# Patient Record
Sex: MALE
[De-identification: names, ages, dates, MRNs, and addresses within clinical notes are randomized per-mention and may not be internally consistent; named-entity substitution may affect disease eponyms.]

---

## 2017-02-15 PROCEDURE — 88305 TISSUE EXAM BY PATHOLOGIST: CPT | Performed by: INTERNAL MEDICINE

## 2017-07-04 ENCOUNTER — DIAGNOSTIC TRANS (OUTPATIENT)
Dept: OTHER | Age: 72
End: 2017-07-04

## 2017-07-04 ENCOUNTER — HOSPITAL (OUTPATIENT)
Dept: OTHER | Age: 72
End: 2017-07-04
Attending: EMERGENCY MEDICINE

## 2017-07-04 LAB
ANALYZER ANC (IANC): ABNORMAL
ANION GAP SERPL CALC-SCNC: 12 MMOL/L (ref 10–20)
BASOPHILS # BLD: 0 THOUSAND/MCL (ref 0–0.3)
BASOPHILS NFR BLD: 1 %
BNP SERPL-MCNC: <5 PG/ML
BUN SERPL-MCNC: 22 MG/DL (ref 6–20)
BUN/CREAT SERPL: 15 (ref 7–25)
CALCIUM SERPL-MCNC: 9.1 MG/DL (ref 8.4–10.2)
CHLORIDE: 105 MMOL/L (ref 98–107)
CO2 SERPL-SCNC: 29 MMOL/L (ref 21–32)
CREAT SERPL-MCNC: 1.43 MG/DL (ref 0.67–1.17)
D DIMER PPP FEU-MCNC: 0.34 MG/L FEU
DIFFERENTIAL METHOD BLD: ABNORMAL
EOSINOPHIL # BLD: 0.2 THOUSAND/MCL (ref 0.1–0.5)
EOSINOPHIL NFR BLD: 4 %
ERYTHROCYTE [DISTWIDTH] IN BLOOD: 13 % (ref 11–15)
GLUCOSE SERPL-MCNC: 106 MG/DL (ref 65–99)
HEMATOCRIT: 42.3 % (ref 39–51)
HGB BLD-MCNC: 15.5 GM/DL (ref 13–17)
LYMPHOCYTES # BLD: 1.8 THOUSAND/MCL (ref 1–4)
LYMPHOCYTES NFR BLD: 30 %
MCH RBC QN AUTO: 32.4 PG (ref 26–34)
MCHC RBC AUTO-ENTMCNC: 36.6 GM/DL (ref 32–36.5)
MCV RBC AUTO: 88.5 FL (ref 78–100)
MONOCYTES # BLD: 0.5 THOUSAND/MCL (ref 0.3–0.9)
MONOCYTES NFR BLD: 9 %
NEUTROPHILS # BLD: 3.5 THOUSAND/MCL (ref 1.8–7.7)
NEUTROPHILS NFR BLD: 56 %
NEUTS SEG NFR BLD: ABNORMAL %
PERCENT NRBC: ABNORMAL
PLATELET # BLD: 209 THOUSAND/MCL (ref 140–450)
POTASSIUM SERPL-SCNC: 4 MMOL/L (ref 3.4–5.1)
RBC # BLD: 4.78 MILLION/MCL (ref 4.5–5.9)
SODIUM SERPL-SCNC: 142 MMOL/L (ref 135–145)
TROPONIN I SERPL HS-MCNC: <0.02 NG/ML
WBC # BLD: 6.1 THOUSAND/MCL (ref 4.2–11)

## 2017-07-05 ENCOUNTER — CHARTING TRANS (OUTPATIENT)
Dept: OTHER | Age: 72
End: 2017-07-05

## 2017-08-31 ENCOUNTER — HOSPITAL (OUTPATIENT)
Dept: OTHER | Age: 72
End: 2017-08-31
Attending: INTERNAL MEDICINE

## 2017-08-31 LAB
ANALYZER ANC (IANC): NORMAL
ANION GAP SERPL CALC-SCNC: 12 MMOL/L (ref 10–20)
APTT PPP: 27 SECONDS (ref 22–30)
APTT PPP: NORMAL S
BASOPHILS # BLD: 0 THOUSAND/MCL (ref 0–0.3)
BASOPHILS NFR BLD: 1 %
BUN SERPL-MCNC: 20 MG/DL (ref 6–20)
BUN/CREAT SERPL: 15 (ref 7–25)
CALCIUM SERPL-MCNC: 8.9 MG/DL (ref 8.4–10.2)
CHLORIDE: 107 MMOL/L (ref 98–107)
CO2 SERPL-SCNC: 30 MMOL/L (ref 21–32)
CREAT SERPL-MCNC: 1.34 MG/DL (ref 0.67–1.17)
DIFFERENTIAL METHOD BLD: NORMAL
EOSINOPHIL # BLD: 0.2 THOUSAND/MCL (ref 0.1–0.5)
EOSINOPHIL NFR BLD: 4 %
ERYTHROCYTE [DISTWIDTH] IN BLOOD: 13.2 % (ref 11–15)
GLUCOSE BLDC GLUCOMTR-MCNC: 161 MG/DL (ref 65–99)
GLUCOSE SERPL-MCNC: 109 MG/DL (ref 65–99)
HEMATOCRIT: 43.2 % (ref 39–51)
HGB BLD-MCNC: 15 GM/DL (ref 13–17)
INR PPP: 1
LYMPHOCYTES # BLD: 1.9 THOUSAND/MCL (ref 1–4)
LYMPHOCYTES NFR BLD: 30 %
MCH RBC QN AUTO: 30.9 PG (ref 26–34)
MCHC RBC AUTO-ENTMCNC: 34.7 GM/DL (ref 32–36.5)
MCV RBC AUTO: 89.1 FL (ref 78–100)
MONOCYTES # BLD: 0.6 THOUSAND/MCL (ref 0.3–0.9)
MONOCYTES NFR BLD: 9 %
NEUTROPHILS # BLD: 3.6 THOUSAND/MCL (ref 1.8–7.7)
NEUTROPHILS NFR BLD: 56 %
NEUTS SEG NFR BLD: NORMAL %
PERCENT NRBC: NORMAL
PLATELET # BLD: 210 THOUSAND/MCL (ref 140–450)
POTASSIUM SERPL-SCNC: 4 MMOL/L (ref 3.4–5.1)
PROTHROMBIN TIME: 11 SECONDS (ref 9.7–11.8)
PROTHROMBIN TIME: NORMAL
RBC # BLD: 4.85 MILLION/MCL (ref 4.5–5.9)
SODIUM SERPL-SCNC: 145 MMOL/L (ref 135–145)
TROPONIN I SERPL HS-MCNC: <0.02 NG/ML
WBC # BLD: 6.4 THOUSAND/MCL (ref 4.2–11)

## 2017-09-01 ENCOUNTER — DIAGNOSTIC TRANS (OUTPATIENT)
Dept: OTHER | Age: 72
End: 2017-09-01

## 2017-09-01 LAB
ALBUMIN SERPL-MCNC: 4.1 GM/DL (ref 3.6–5.1)
ALBUMIN/GLOB SERPL: 1.5 {RATIO} (ref 1–2.4)
ALP SERPL-CCNC: 86 UNIT/L (ref 45–117)
ALT SERPL-CCNC: 24 UNIT/L
ANALYZER ANC (IANC): ABNORMAL
ANION GAP SERPL CALC-SCNC: 16 MMOL/L (ref 10–20)
AST SERPL-CCNC: 15 UNIT/L
BASOPHILS # BLD: 0 THOUSAND/MCL (ref 0–0.3)
BASOPHILS NFR BLD: 0 %
BILIRUB SERPL-MCNC: 2.4 MG/DL (ref 0.2–1)
BUN SERPL-MCNC: 17 MG/DL (ref 6–20)
BUN/CREAT SERPL: 13 (ref 7–25)
CALCIUM SERPL-MCNC: 9 MG/DL (ref 8.4–10.2)
CHLORIDE: 104 MMOL/L (ref 98–107)
CHOLEST SERPL-MCNC: 149 MG/DL
CHOLEST/HDLC SERPL: 3.5 {RATIO}
CO2 SERPL-SCNC: 29 MMOL/L (ref 21–32)
CREAT SERPL-MCNC: 1.28 MG/DL (ref 0.67–1.17)
DIFFERENTIAL METHOD BLD: ABNORMAL
EOSINOPHIL # BLD: 0 THOUSAND/MCL (ref 0.1–0.5)
EOSINOPHIL NFR BLD: 0 %
ERYTHROCYTE [DISTWIDTH] IN BLOOD: 13 % (ref 11–15)
GLOBULIN SER-MCNC: 2.8 GM/DL (ref 2–4)
GLUCOSE BLDC GLUCOMTR-MCNC: 133 MG/DL (ref 65–99)
GLUCOSE SERPL-MCNC: 130 MG/DL (ref 65–99)
HDLC SERPL-MCNC: 43 MG/DL
HEMATOCRIT: 43.7 % (ref 39–51)
HGB BLD-MCNC: 15.4 GM/DL (ref 13–17)
LDLC SERPL CALC-MCNC: 90 MG/DL
LYMPHOCYTES # BLD: 1.5 THOUSAND/MCL (ref 1–4)
LYMPHOCYTES NFR BLD: 16 %
MAGNESIUM SERPL-MCNC: 1.8 MG/DL (ref 1.7–2.4)
MCH RBC QN AUTO: 31.2 PG (ref 26–34)
MCHC RBC AUTO-ENTMCNC: 35.2 GM/DL (ref 32–36.5)
MCV RBC AUTO: 88.5 FL (ref 78–100)
MONOCYTES # BLD: 0.5 THOUSAND/MCL (ref 0.3–0.9)
MONOCYTES NFR BLD: 5 %
NEUTROPHILS # BLD: 7.2 THOUSAND/MCL (ref 1.8–7.7)
NEUTROPHILS NFR BLD: 79 %
NEUTS SEG NFR BLD: ABNORMAL %
NONHDLC SERPL-MCNC: 106 MG/DL
PERCENT NRBC: ABNORMAL
PLATELET # BLD: 250 THOUSAND/MCL (ref 140–450)
POTASSIUM SERPL-SCNC: 3.9 MMOL/L (ref 3.4–5.1)
PROT SERPL-MCNC: 6.9 GM/DL (ref 6.4–8.2)
RBC # BLD: 4.94 MILLION/MCL (ref 4.5–5.9)
SODIUM SERPL-SCNC: 145 MMOL/L (ref 135–145)
TRIGLYCERIDE (TRIGP): 78 MG/DL
WBC # BLD: 9.3 THOUSAND/MCL (ref 4.2–11)

## 2017-09-02 LAB
ANALYZER ANC (IANC): NORMAL
ANION GAP SERPL CALC-SCNC: 14 MMOL/L (ref 10–20)
BUN SERPL-MCNC: 22 MG/DL (ref 6–20)
BUN/CREAT SERPL: 18 (ref 7–25)
CALCIUM SERPL-MCNC: 8.8 MG/DL (ref 8.4–10.2)
CHLORIDE: 106 MMOL/L (ref 98–107)
CO2 SERPL-SCNC: 28 MMOL/L (ref 21–32)
CREAT SERPL-MCNC: 1.25 MG/DL (ref 0.67–1.17)
ERYTHROCYTE [DISTWIDTH] IN BLOOD: 13.1 % (ref 11–15)
GLUCOSE SERPL-MCNC: 100 MG/DL (ref 65–99)
HEMATOCRIT: 44.7 % (ref 39–51)
HGB BLD-MCNC: 15.3 GM/DL (ref 13–17)
MCH RBC QN AUTO: 30.8 PG (ref 26–34)
MCHC RBC AUTO-ENTMCNC: 34.2 GM/DL (ref 32–36.5)
MCV RBC AUTO: 89.9 FL (ref 78–100)
PLATELET # BLD: 218 THOUSAND/MCL (ref 140–450)
POTASSIUM SERPL-SCNC: 3.8 MMOL/L (ref 3.4–5.1)
RBC # BLD: 4.97 MILLION/MCL (ref 4.5–5.9)
SODIUM SERPL-SCNC: 144 MMOL/L (ref 135–145)
WBC # BLD: 7.9 THOUSAND/MCL (ref 4.2–11)

## 2017-10-03 ENCOUNTER — CHARTING TRANS (OUTPATIENT)
Dept: OTHER | Age: 72
End: 2017-10-03

## 2017-12-08 PROBLEM — G45.9 TRANSIENT CEREBRAL ISCHEMIA, UNSPECIFIED TYPE: Status: ACTIVE | Noted: 2017-12-08

## 2024-05-31 ENCOUNTER — APPOINTMENT (OUTPATIENT)
Dept: URBAN - NONMETROPOLITAN AREA CLINIC 54 | Age: 79
Setting detail: DERMATOLOGY
End: 2024-06-01

## 2024-05-31 DIAGNOSIS — D49.2 NEOPLASM OF UNSPECIFIED BEHAVIOR OF BONE, SOFT TISSUE, AND SKIN: ICD-10-CM

## 2024-05-31 DIAGNOSIS — L91.8 OTHER HYPERTROPHIC DISORDERS OF THE SKIN: ICD-10-CM

## 2024-05-31 DIAGNOSIS — L21.8 OTHER SEBORRHEIC DERMATITIS: ICD-10-CM

## 2024-05-31 DIAGNOSIS — L82.1 OTHER SEBORRHEIC KERATOSIS: ICD-10-CM

## 2024-05-31 PROCEDURE — 11201 RMVL SKIN TAGS EA ADDL 10: CPT

## 2024-05-31 PROCEDURE — OTHER BIOPSY BY SHAVE METHOD: OTHER

## 2024-05-31 PROCEDURE — OTHER COUNSELING: OTHER

## 2024-05-31 PROCEDURE — OTHER PRESCRIPTION MEDICATION MANAGEMENT: OTHER

## 2024-05-31 PROCEDURE — 11102 TANGNTL BX SKIN SINGLE LES: CPT

## 2024-05-31 PROCEDURE — OTHER SKIN TAG REMOVAL: OTHER

## 2024-05-31 PROCEDURE — OTHER ADDITIONAL NOTES: OTHER

## 2024-05-31 PROCEDURE — 99203 OFFICE O/P NEW LOW 30 MIN: CPT | Mod: 25

## 2024-05-31 PROCEDURE — OTHER PRESCRIPTION: OTHER

## 2024-05-31 PROCEDURE — 11200 RMVL SKIN TAGS UP TO&INC 15: CPT | Mod: 59

## 2024-05-31 RX ORDER — KETOCONAZOLE 20 MG/ML
SHAMPOO, SUSPENSION TOPICAL
Qty: 120 | Refills: 2 | Status: ERX | COMMUNITY
Start: 2024-05-31

## 2024-05-31 RX ORDER — CLOBETASOL PROPIONATE 0.5 MG/ML
SOLUTION TOPICAL BID
Qty: 50 | Refills: 2 | Status: CANCELLED | COMMUNITY
Start: 2024-05-31

## 2024-05-31 ASSESSMENT — LOCATION SIMPLE DESCRIPTION DERM
LOCATION SIMPLE: RIGHT ANTERIOR AXILLA
LOCATION SIMPLE: RIGHT AXILLARY VAULT
LOCATION SIMPLE: ABDOMEN
LOCATION SIMPLE: RIGHT UPPER ARM
LOCATION SIMPLE: SCALP
LOCATION SIMPLE: NOSE
LOCATION SIMPLE: RIGHT POSTERIOR AXILLA

## 2024-05-31 ASSESSMENT — LOCATION ZONE DERM
LOCATION ZONE: TRUNK
LOCATION ZONE: ARM
LOCATION ZONE: SCALP
LOCATION ZONE: AXILLAE
LOCATION ZONE: NOSE

## 2024-05-31 ASSESSMENT — LOCATION DETAILED DESCRIPTION DERM
LOCATION DETAILED: LEFT SUPERIOR PARIETAL SCALP
LOCATION DETAILED: RIGHT ANTERIOR AXILLA
LOCATION DETAILED: RIGHT AXILLARY VAULT
LOCATION DETAILED: RIGHT RIB CAGE
LOCATION DETAILED: NASAL TIP
LOCATION DETAILED: RIGHT POSTERIOR AXILLA
LOCATION DETAILED: RIGHT ANTERIOR MEDIAL PROXIMAL UPPER ARM

## 2024-05-31 NOTE — PROCEDURE: BIOPSY BY SHAVE METHOD
Detail Level: Detailed
Depth Of Biopsy: dermis
Was A Bandage Applied: Yes
Size Of Lesion In Cm: 0.8
X Size Of Lesion In Cm: 0.7
Biopsy Type: H and E
Biopsy Method: Personna blade
Anesthesia Type: 1% lidocaine with epinephrine and a 1:10 solution of 8.4% sodium bicarbonate
Anesthesia Volume In Cc: 1
Additional Anesthesia Volume In Cc (Will Not Render If 0): 0
Hemostasis: Aluminum Chloride and Electrocautery
Wound Care: Petrolatum
Dressing: bandage
Destruction After The Procedure: No
Type Of Destruction Used: Curettage
Curettage Text: The wound bed was treated with curettage after the biopsy was performed.
Cryotherapy Text: The wound bed was treated with cryotherapy after the biopsy was performed.
Electrodesiccation Text: The wound bed was treated with electrodesiccation after the biopsy was performed.
Electrodesiccation And Curettage Text: The wound bed was treated with electrodesiccation and curettage after the biopsy was performed.
Silver Nitrate Text: The wound bed was treated with silver nitrate after the biopsy was performed.
Lab: [id number]
Consent: Written consent was obtained and risks were reviewed including but not limited to scarring, infection, bleeding, scabbing, incomplete removal, nerve damage and allergy to anesthesia.
Post-Care Instructions: I reviewed with the patient in detail post-care instructions. Patient is to keep the biopsy site dry overnight, and then apply Vaseline twice daily until healed.
Notification Instructions: Patient will be notified of biopsy results. However, patient instructed to call the office if not contacted within 2 weeks.
Billing Type: Third-Party Bill
Information: Selecting Yes will display possible errors in your note based on the variables you have selected. This validation is only offered as a suggestion for you. PLEASE NOTE THAT THE VALIDATION TEXT WILL BE REMOVED WHEN YOU FINALIZE YOUR NOTE. IF YOU WANT TO FAX A PRELIMINARY NOTE YOU WILL NEED TO TOGGLE THIS TO 'NO' IF YOU DO NOT WANT IT IN YOUR FAXED NOTE.

## 2024-05-31 NOTE — PROCEDURE: ADDITIONAL NOTES
Additional Notes: Forceps were used for liquid nitrogen application.
Detail Level: Simple
Render Risk Assessment In Note?: no

## 2024-05-31 NOTE — PROCEDURE: PRESCRIPTION MEDICATION MANAGEMENT
Discontinue Regimen: betamethasone diproprionate lotion
Initiate Treatment: ketoconazole 2 % shampoo \\nQuantity: 120.0 ml  Days Supply: 30\\nSig: Shampoo every other wash, let soak on scalp for 5 minutes then rinse, then use once to twice weekly for maintenance PRN.\\n\\nclobetasol 0.05 % scalp solution BID\\nQuantity: 50.0 ml  Days Supply: 30\\nSig: Apply to damp scalp up to BID x 2 weeks PRN for itching.
Detail Level: Zone
Render In Strict Bullet Format?: No

## 2024-06-03 ENCOUNTER — RX ONLY (RX ONLY)
Age: 79
End: 2024-06-03

## 2024-06-03 RX ORDER — CLOBETASOL PROPIONATE 0.5 MG/ML
SOLUTION TOPICAL BID
Qty: 50 | Refills: 2 | Status: ERX

## 2024-06-03 RX ORDER — CLOBETASOL PROPIONATE 0.5 MG/ML
SOLUTION TOPICAL BID
Qty: 50 | Refills: 2 | Status: ERX | COMMUNITY
Start: 2024-06-03